# Patient Record
Sex: MALE | Race: WHITE
[De-identification: names, ages, dates, MRNs, and addresses within clinical notes are randomized per-mention and may not be internally consistent; named-entity substitution may affect disease eponyms.]

---

## 2021-05-11 ENCOUNTER — HOSPITAL ENCOUNTER (EMERGENCY)
Dept: HOSPITAL 53 - M ED | Age: 21
Discharge: HOME | End: 2021-05-11
Payer: COMMERCIAL

## 2021-05-11 VITALS
DIASTOLIC BLOOD PRESSURE: 68 MMHG | BODY MASS INDEX: 29.22 KG/M2 | WEIGHT: 220.46 LBS | SYSTOLIC BLOOD PRESSURE: 148 MMHG | HEIGHT: 73 IN

## 2021-05-11 DIAGNOSIS — S02.2XXA: ICD-10-CM
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
## 2021-05-11 DIAGNOSIS — S06.0X0A: Primary | ICD-10-CM

## 2021-05-11 DIAGNOSIS — S00.81XA: ICD-10-CM
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
## 2021-05-11 NOTE — REPVR
PROCEDURE INFORMATION: 

Exam: CT Maxillofacial Without Contrast 

Exam date and time: 5/11/2021 4:45 PM 

Age: 20 years old 

Clinical indication: Injury or trauma; Fall; Blunt trauma (contusions or 

hematomas); Forehead 



TECHNIQUE: 

Imaging protocol: Computed tomography images of the face without contrast. 

Radiation optimization: All CT scans at this facility use at least one of these 

dose optimization techniques: automated exposure control; mA and/or kV 

adjustment per patient size (includes targeted exams where dose is matched to 

clinical indication); or iterative reconstruction. 



COMPARISON: 

No relevant prior studies available. 



FINDINGS: 

Orbital cavity: Orbits are normal. Globes are unremarkable. 

Bones/joints: Minimal deformity of the anterior nasal bones is noted, possibly 

related to old trauma. 

Paranasal sinuses: Normal. No air-fluid levels. 

Soft tissues: Mild forehead soft tissue swelling is present. 



IMPRESSION: 

1. Mild forehead soft tissue swelling. 

2. Minimal deformity of the anterior nasal bones is noted, possibly related to 

old trauma.  Clinical correlation is recommended.  



Electronically signed by: Luis Fernando More On 05/11/2021  17:05:17 PM

## 2021-05-11 NOTE — REPVR
PROCEDURE INFORMATION: 

Exam: CT Head Without Contrast 

Exam date and time: 5/11/2021 4:45 PM 

Age: 20 years old 

Clinical indication: Injury or trauma; Fall; Blunt trauma (contusions or 

hematomas) 



TECHNIQUE: 

Imaging protocol: Computed tomography of the head without contrast. 

Radiation optimization: All CT scans at this facility use at least one of these 

dose optimization techniques: automated exposure control; mA and/or kV 

adjustment per patient size (includes targeted exams where dose is matched to 

clinical indication); or iterative reconstruction. 



COMPARISON: 

No relevant prior studies available. 



FINDINGS: 

Brain: No acute intracranial hemorrhage, cerebral edema, or midline shift. 

Cerebral ventricles: No hydrocephalus. 

Bones/joints: No acute fracture. 

Paranasal sinuses: There is no acute sinusitis. 

Mastoid air cells: Visualized mastoid air cells are well aerated. 

Orbital cavity: Unremarkable as visualized. 

Soft tissues: Unremarkable. 



IMPRESSION: 

No acute intracranial abnormality. 



Electronically signed by: Luis Fernando More On 05/11/2021  17:02:21 PM

## 2022-06-08 ENCOUNTER — HOSPITAL ENCOUNTER (EMERGENCY)
Dept: HOSPITAL 53 - M ED | Age: 22
Discharge: HOME | End: 2022-06-08
Payer: COMMERCIAL

## 2022-06-08 VITALS — DIASTOLIC BLOOD PRESSURE: 70 MMHG | SYSTOLIC BLOOD PRESSURE: 114 MMHG

## 2022-06-08 VITALS — HEIGHT: 72 IN | WEIGHT: 224.87 LBS | BODY MASS INDEX: 30.46 KG/M2

## 2022-06-08 DIAGNOSIS — Z87.820: ICD-10-CM

## 2022-06-08 DIAGNOSIS — R29.818: Primary | ICD-10-CM

## 2022-06-08 LAB
AMPHETAMINES UR QL SCN: NEGATIVE
BARBITURATES UR QL SCN: NEGATIVE
BENZODIAZ UR QL SCN: NEGATIVE
BUN SERPL-MCNC: 12 MG/DL (ref 7–18)
BZE UR QL SCN: NEGATIVE
CALCIUM SERPL-MCNC: 9.3 MG/DL (ref 8.5–10.1)
CANNABINOIDS UR QL SCN: NEGATIVE
CHLORIDE SERPL-SCNC: 104 MEQ/L (ref 98–107)
CO2 SERPL-SCNC: 32 MEQ/L (ref 21–32)
CREAT SERPL-MCNC: 1.05 MG/DL (ref 0.7–1.3)
GFR SERPL CREATININE-BSD FRML MDRD: > 60 ML/MIN/{1.73_M2} (ref 60–?)
GLUCOSE SERPL-MCNC: 99 MG/DL (ref 70–100)
HCT VFR BLD AUTO: 42.7 % (ref 42–52)
HGB BLD-MCNC: 15 G/DL (ref 13.5–17.5)
MCH RBC QN AUTO: 30.1 PG (ref 27–33)
MCHC RBC AUTO-ENTMCNC: 35.1 G/DL (ref 32–36.5)
MCV RBC AUTO: 85.6 FL (ref 80–96)
METHADONE UR QL SCN: NEGATIVE
OPIATES UR QL SCN: NEGATIVE
PCP UR QL SCN: NEGATIVE
PLATELET # BLD AUTO: 305 10^3/UL (ref 150–450)
POTASSIUM SERPL-SCNC: 4.5 MEQ/L (ref 3.5–5.1)
RBC # BLD AUTO: 4.99 10^6/UL (ref 4.3–6.1)
SODIUM SERPL-SCNC: 137 MEQ/L (ref 136–145)
WBC # BLD AUTO: 6.8 10^3/UL (ref 4–10)